# Patient Record
Sex: FEMALE | Race: WHITE | URBAN - METROPOLITAN AREA
[De-identification: names, ages, dates, MRNs, and addresses within clinical notes are randomized per-mention and may not be internally consistent; named-entity substitution may affect disease eponyms.]

---

## 2017-05-13 ENCOUNTER — OFFICE VISIT (OUTPATIENT)
Dept: URGENT CARE | Facility: URGENT CARE | Age: 32
End: 2017-05-13
Payer: COMMERCIAL

## 2017-05-13 VITALS — RESPIRATION RATE: 16 BRPM

## 2017-05-13 DIAGNOSIS — S81.812A LACERATION OF LEG, LEFT, INITIAL ENCOUNTER: Primary | ICD-10-CM

## 2017-05-13 PROCEDURE — 12032 INTMD RPR S/A/T/EXT 2.6-7.5: CPT | Performed by: PHYSICIAN ASSISTANT

## 2017-05-13 ASSESSMENT — ENCOUNTER SYMPTOMS
BACK PAIN: 0
DIZZINESS: 0
SHORTNESS OF BREATH: 0
EYE DISCHARGE: 0
LOSS OF CONSCIOUSNESS: 0
WHEEZING: 0
SENSORY CHANGE: 0
WEAKNESS: 0
VOMITING: 0
DYSURIA: 0
HALLUCINATIONS: 0
ORTHOPNEA: 0
BLOOD IN STOOL: 0
DEPRESSION: 0
SEIZURES: 0
WEIGHT LOSS: 0
MYALGIAS: 0
FOCAL WEAKNESS: 0
DIAPHORESIS: 0
HEMOPTYSIS: 0
FREQUENCY: 0
CONSTIPATION: 0
EYE PAIN: 0
DOUBLE VISION: 0
SORE THROAT: 0
PHOTOPHOBIA: 0
EYE REDNESS: 0
PALPITATIONS: 0
ABDOMINAL PAIN: 0
NERVOUS/ANXIOUS: 0
COUGH: 0
FEVER: 0
ROS SKIN COMMENTS: LACERATION
DIARRHEA: 0
NEUROLOGICAL NEGATIVE: 1
NECK PAIN: 0
TINGLING: 0
NAUSEA: 0
HEADACHES: 0
INSOMNIA: 0
SPUTUM PRODUCTION: 0
BLURRED VISION: 0
HEARTBURN: 0

## 2017-05-13 ASSESSMENT — LIFESTYLE VARIABLES: SUBSTANCE_ABUSE: 0

## 2017-05-13 NOTE — NURSING NOTE
"Chief Complaint   Patient presents with     Laceration     Left upper leg laceration- fell on dock this morning. Up to date on tetanus.       Initial Resp 16 Estimated body mass index is 19.77 kg/(m^2) as calculated from the following:    Height as of 8/29/08: 5' 8\" (1.727 m).    Weight as of 8/29/08: 130 lb (59 kg).  Medication Reconciliation: complete    "

## 2017-05-13 NOTE — MR AVS SNAPSHOT
After Visit Summary   5/13/2017    Lynne Hernandez    MRN: [id number]           Patient Information     Date Of Birth          1985        Visit Information        Provider Department      5/13/2017 10:55 AM Otto Varma PA-C [facility]        Today's Diagnoses     Laceration of leg, left, initial encounter    -  1      Care Instructions      Extremity Laceration: Sutures, Staples, or Tape  A laceration is a cut through the skin. If it is deep, it may require stitches (sutures) or staples to close so it can heal. Minor cuts may be treated with surgical tape closures.   X-rays may be done if something may have entered the skin through the cut. You may also need a tetanus shot if you are not up to date on this vaccination.  Home care    Follow the health care provider s instructions on how to care for the cut.    Wash your hands with soap and warm water before and after caring for your wound. This is to help prevent infection.    Keep the wound clean and dry. If a bandage was applied and it becomes wet or dirty, replace it. Otherwise, leave it in place for the first 24 hours, then change it once a day or as directed.    If sutures or staples were used, clean the wound daily:    After removing the bandage, wash the area with soap and water. Use a wet cotton swab to loosen and remove any blood or crust that forms.    After cleaning, keep the wound clean and dry. Talk with your doctor before applying any antibiotic ointment to the wound. Reapply the bandage.    You may remove the bandage to shower as usual after the first 24 hours, but do not soak the area in water (no swimming) until the stitches or staples are removed.    If surgical tape closures were used, keep the area clean and dry. If it becomes wet, blot it dry with a towel.    The doctor may prescribe an antibiotic cream or ointment to prevent infection. Do not stop taking this medication until you have  finished the prescribed course or the doctor tells you to stop. The doctor may also prescribe medications for pain. Follow the doctor s instructions for taking these medications.    Avoid activities that may reopen your wound.  Follow-up care  Follow up with your health care provider. Most skin wounds heal within ten days. However, an infection may sometimes occur despite proper treatment. Therefore, check the wound daily for the signs of infection listed below. Stitches and staples should be removed within 7-14 days. If surgical tape closures were used, you may remove them after 10 days if they have not fallen off by then.   When to seek medical advice  Call your health care provider right away if any of these occur:    Wound bleeding not controlled by direct pressure    Signs of infection, including increasing pain in the wound, increasing wound redness or swelling, or pus or bad odor coming from the wound    Fever of 100.4 F (38 C) or higher or as directed by your healthcare provider    Stitches or staples come apart or fall out or surgical tape falls off before 7 days    Wound edges re-open    Wound changes colors    Numbness around the wound     Decreased movement around the injured area    8432-2963 [employer] [address] [place] [address]. All rights reserved. This information is not intended as a substitute for professional medical care. Always follow your healthcare professional's instructions.              Follow-ups after your visit        Follow-up notes from your care team     Return in about 10 days (around 5/23/2017), or if symptoms worsen or fail to improve.      Who to contact     If you have questions or need follow up information about today's clinic visit or your schedule please contact [facility] directly at [phone].  Normal or non-critical lab and imaging results will be communicated to you by MyChart, letter or phone within 4  business days after the clinic has received the results. If you do not hear from us within 7 days, please contact the clinic through [employer] or phone. If you have a critical or abnormal lab result, we will notify you by phone as soon as possible.  Submit refill requests through [employer] or call your pharmacy and they will forward the refill request to us. Please allow 3 business days for your refill to be completed.          Additional Information About Your Visit        [email or web address] Information     [employer] gives you secure access to your electronic health record. If you see a primary care provider, you can also send messages to your care team and make appointments. If you have questions, please call your primary care clinic.  If you do not have a primary care provider, please call [phone] and they will assist you.        Care EveryWhere ID     This is your Care EveryWhere ID. This could be used by other organizations to access your [facility] medical records  [id number]        Your Vitals Were     Respirations                   16            Blood Pressure from Last 3 Encounters:   08/29/08 130/72    Weight from Last 3 Encounters:   08/29/08 130 lb (59 kg)              We Performed the Following     REPAIR INTERMED, WOUND TRUNK/ARM/LEG 2.6-7.5 CM        Primary Care Provider    Doctor Unknown, MD       No address on file        Thank you!     Thank you for choosing [facility]  for your care. Our goal is always to provide you with excellent care. Hearing back from our patients is one way we can continue to improve our services. Please take a few minutes to complete the written survey that you may receive in the mail after your visit with us. Thank you!             Your Updated Medication List - Protect others around you: Learn how to safely use, store and throw away your medicines at www.disposemymeds.org.          This list is accurate as of: 5/13/17 12:51 PM.  Always use your most  recent med list.                   Brand Name Dispense Instructions for use    ADVAIR DISKUS 250-50 MCG/DOSE diskus inhaler   Generic drug:  fluticasone-salmeterol      Reported on 5/13/2017       ALBUTEROL INHALER 17GM      Reported on 5/13/2017       baclofen 10 MG tablet    LIORESAL     Reported on 5/13/2017       LORazepam 2 MG tablet    ATIVAN    10    1/2 tablet 2 times a day as neeed.       PARoxetine 20 MG tablet    PAXIL    30    1 TABLET DAILY

## 2017-05-13 NOTE — PROGRESS NOTES
HPI    SUBJECTIVE:                                                    Lynne Hernandez is a 32 year old female who presents to clinic today for  laceration to her left upper leg. This occurred when she fell and hit the corner of a dock. This caused a very large puncture wound/laceration. She is up-to-date on tetanus vaccination       Problem list and histories reviewed & adjusted, as indicated.  Additional history: as documented    Patient Active Problem List   Diagnosis     Depression     Generalized anxiety disorder     CARDIOVASCULAR SCREENING; LDL GOAL LESS THAN 160     History reviewed. No pertinent surgical history.    Social History   Substance Use Topics     Smoking status: Never Smoker     Smokeless tobacco: Not on file     Alcohol use Yes     Family History   Problem Relation Age of Onset     Hypertension Mother      Lipids Father      Prostate Cancer Maternal Grandfather      Lipids Paternal Grandfather          Current Outpatient Prescriptions   Medication Sig Dispense Refill     LORAZEPAM 2 MG OR TABS 1/2 tablet 2 times a day as neeed.  (Patient not taking: Reported on 5/13/2017) 10 0     PAROXETINE HCL 20 MG OR TABS 1 TABLET DAILY (Patient not taking: No sig reported) 30 0     ALBUTEROL INHALER 17GM Reported on 5/13/2017       ADVAIR DISKUS 250-50 MCG/DOSE IN MISC Reported on 5/13/2017       BACLOFEN 10 MG OR TABS Reported on 5/13/2017       No Known Allergies  Labs reviewed in EPIC    Reviewed and updated as needed this visit by clinical staff  Tobacco  Allergies  Meds  Med Hx  Surg Hx  Fam Hx  Soc Hx      Reviewed and updated as needed this visit by Provider               Review of Systems   Constitutional: Negative for diaphoresis, fever, malaise/fatigue and weight loss.   HENT: Negative for congestion, ear discharge, ear pain, hearing loss, nosebleeds and sore throat.    Eyes: Negative for blurred vision, double vision, photophobia, pain, discharge and redness.   Respiratory: Negative for cough,  hemoptysis, sputum production, shortness of breath and wheezing.    Cardiovascular: Negative for chest pain, palpitations, orthopnea and leg swelling.   Gastrointestinal: Negative for abdominal pain, blood in stool, constipation, diarrhea, heartburn, melena, nausea and vomiting.   Genitourinary: Negative.  Negative for dysuria, frequency and urgency.   Musculoskeletal: Negative for back pain, joint pain, myalgias and neck pain.   Skin: Negative for itching and rash.        laceration   Neurological: Negative.  Negative for dizziness, tingling, sensory change, focal weakness, seizures, loss of consciousness, weakness and headaches.   Endo/Heme/Allergies: Negative.    Psychiatric/Behavioral: Negative for depression, hallucinations, substance abuse and suicidal ideas. The patient is not nervous/anxious and does not have insomnia.          Physical Exam   Constitutional: She is oriented to person, place, and time and well-developed, well-nourished, and in no distress. No distress.   HENT:   Head: Normocephalic and atraumatic.   Right Ear: External ear normal.   Left Ear: External ear normal.   Nose: Nose normal.   Eyes: Conjunctivae and EOM are normal. Pupils are equal, round, and reactive to light. Right eye exhibits no discharge. Left eye exhibits no discharge. No scleral icterus.   Neck: Normal range of motion. Neck supple. No JVD present. No tracheal deviation present. No thyromegaly present.   Cardiovascular: Normal rate, regular rhythm, normal heart sounds and intact distal pulses.  Exam reveals no gallop and no friction rub.    No murmur heard.  Pulmonary/Chest: Effort normal and breath sounds normal. No stridor. No respiratory distress. She has no wheezes. She has no rales. She exhibits no tenderness.   Abdominal: Soft. Bowel sounds are normal. She exhibits no distension and no mass. There is no tenderness. There is no rebound and no guarding.   Musculoskeletal: Normal range of motion. She exhibits no edema or  tenderness.   Lymphadenopathy:     She has no cervical adenopathy.   Neurological: She is alert and oriented to person, place, and time. She has normal reflexes. No cranial nerve deficit. She exhibits normal muscle tone. Gait normal.   Skin: Skin is warm and dry. Laceration noted. No rash noted. She is not diaphoretic. No erythema. No pallor.   There is a large 3 cm laceration with a deep puncture as well. This extends to the muscle. Careful inspection reveals no foreign object   Psychiatric: Mood, memory, affect and judgment normal.           (S81.015A) Laceration of leg, left, initial encounter  (primary encounter diagnosis)  Comment:   Plan: REPAIR INTERMED, WOUND TRUNK/ARM/LEG 2.6-7.5 CM                Procedure note:the wound was soaked with dilute Hibiclens solution and then inspected once again no foreign objects found. Using Vicryl suture a deep closure was performed and then using 4-0 Ethilon skin approximation was achieved with a combination of mattress sutures and a running suture. Bacitracin and Band-Aid was applied and she'll follow-up in 10 days for suture removal.

## 2017-05-13 NOTE — PATIENT INSTRUCTIONS
Extremity Laceration: Sutures, Staples, or Tape  A laceration is a cut through the skin. If it is deep, it may require stitches (sutures) or staples to close so it can heal. Minor cuts may be treated with surgical tape closures.   X-rays may be done if something may have entered the skin through the cut. You may also need a tetanus shot if you are not up to date on this vaccination.  Home care    Follow the health care provider s instructions on how to care for the cut.    Wash your hands with soap and warm water before and after caring for your wound. This is to help prevent infection.    Keep the wound clean and dry. If a bandage was applied and it becomes wet or dirty, replace it. Otherwise, leave it in place for the first 24 hours, then change it once a day or as directed.    If sutures or staples were used, clean the wound daily:    After removing the bandage, wash the area with soap and water. Use a wet cotton swab to loosen and remove any blood or crust that forms.    After cleaning, keep the wound clean and dry. Talk with your doctor before applying any antibiotic ointment to the wound. Reapply the bandage.    You may remove the bandage to shower as usual after the first 24 hours, but do not soak the area in water (no swimming) until the stitches or staples are removed.    If surgical tape closures were used, keep the area clean and dry. If it becomes wet, blot it dry with a towel.    The doctor may prescribe an antibiotic cream or ointment to prevent infection. Do not stop taking this medication until you have finished the prescribed course or the doctor tells you to stop. The doctor may also prescribe medications for pain. Follow the doctor s instructions for taking these medications.    Avoid activities that may reopen your wound.  Follow-up care  Follow up with your health care provider. Most skin wounds heal within ten days. However, an infection may sometimes occur despite proper treatment.  Therefore, check the wound daily for the signs of infection listed below. Stitches and staples should be removed within 7-14 days. If surgical tape closures were used, you may remove them after 10 days if they have not fallen off by then.   When to seek medical advice  Call your health care provider right away if any of these occur:    Wound bleeding not controlled by direct pressure    Signs of infection, including increasing pain in the wound, increasing wound redness or swelling, or pus or bad odor coming from the wound    Fever of 100.4 F (38 C) or higher or as directed by your healthcare provider    Stitches or staples come apart or fall out or surgical tape falls off before 7 days    Wound edges re-open    Wound changes colors    Numbness around the wound     Decreased movement around the injured area    3946-1882 [employer] [address] [place] [address], [place] [address]. All rights reserved. This information is not intended as a substitute for professional medical care. Always follow your healthcare professional's instructions.